# Patient Record
Sex: MALE | Race: WHITE | NOT HISPANIC OR LATINO | Employment: FULL TIME | URBAN - METROPOLITAN AREA
[De-identification: names, ages, dates, MRNs, and addresses within clinical notes are randomized per-mention and may not be internally consistent; named-entity substitution may affect disease eponyms.]

---

## 2017-07-25 ENCOUNTER — OFFICE VISIT (OUTPATIENT)
Dept: FAMILY MEDICINE | Facility: CLINIC | Age: 32
End: 2017-07-25
Payer: COMMERCIAL

## 2017-07-25 VITALS
WEIGHT: 179 LBS | BODY MASS INDEX: 23.72 KG/M2 | DIASTOLIC BLOOD PRESSURE: 84 MMHG | TEMPERATURE: 97.8 F | HEART RATE: 65 BPM | HEIGHT: 73 IN | SYSTOLIC BLOOD PRESSURE: 125 MMHG

## 2017-07-25 DIAGNOSIS — Z23 NEED FOR VACCINATION: Primary | ICD-10-CM

## 2017-07-25 DIAGNOSIS — Z11.3 SCREEN FOR STD (SEXUALLY TRANSMITTED DISEASE): ICD-10-CM

## 2017-07-25 DIAGNOSIS — M79.10 MUSCLE PAIN: ICD-10-CM

## 2017-07-25 LAB — ERYTHROCYTE [SEDIMENTATION RATE] IN BLOOD BY WESTERGREN METHOD: 1 MM/H (ref 0–15)

## 2017-07-25 PROCEDURE — 86618 LYME DISEASE ANTIBODY: CPT | Performed by: FAMILY MEDICINE

## 2017-07-25 PROCEDURE — 86431 RHEUMATOID FACTOR QUANT: CPT | Performed by: FAMILY MEDICINE

## 2017-07-25 PROCEDURE — 87491 CHLMYD TRACH DNA AMP PROBE: CPT | Performed by: FAMILY MEDICINE

## 2017-07-25 PROCEDURE — 85652 RBC SED RATE AUTOMATED: CPT | Performed by: FAMILY MEDICINE

## 2017-07-25 PROCEDURE — 90714 TD VACC NO PRESV 7 YRS+ IM: CPT | Performed by: FAMILY MEDICINE

## 2017-07-25 PROCEDURE — 90471 IMMUNIZATION ADMIN: CPT | Performed by: FAMILY MEDICINE

## 2017-07-25 PROCEDURE — 99213 OFFICE O/P EST LOW 20 MIN: CPT | Mod: 25 | Performed by: FAMILY MEDICINE

## 2017-07-25 PROCEDURE — 87591 N.GONORRHOEAE DNA AMP PROB: CPT | Performed by: FAMILY MEDICINE

## 2017-07-25 PROCEDURE — 86140 C-REACTIVE PROTEIN: CPT | Performed by: FAMILY MEDICINE

## 2017-07-25 PROCEDURE — 36415 COLL VENOUS BLD VENIPUNCTURE: CPT | Performed by: FAMILY MEDICINE

## 2017-07-25 ASSESSMENT — PAIN SCALES - GENERAL: PAINLEVEL: MILD PAIN (3)

## 2017-07-25 NOTE — PATIENT INSTRUCTIONS
Thank you for choosing [facility].  You may be receiving a survey in the mail from [facility] regarding your visit today.  Please take a few minutes to complete and return the survey to let us know how we are doing.      Our Clinic hours are:  Mondays    7:20 am - 7 pm  Tues -  Fri  7:20 am - 5 pm      The clinic lab opens at 7:30 am Mon - Fri and appointments are required.    [facility]  Ph. [phone]  Monday-Thursday 8 am - 7pm  Tues/Wed/Fri 8 am - 5:30 pm

## 2017-07-25 NOTE — NURSING NOTE
"Chief Complaint   Patient presents with     Pain     pt has been having over all body pain on and off for 3-4 weeks       Initial /84 (BP Location: Right arm, Patient Position: Chair, Cuff Size: Adult Large)  Pulse 65  Temp 97.8  F (36.6  C) (Tympanic)  Ht 6' 0.5\" (1.842 m)  Wt 179 lb (81.2 kg)  BMI 23.94 kg/m2 Estimated body mass index is 23.94 kg/(m^2) as calculated from the following:    Height as of this encounter: 6' 0.5\" (1.842 m).    Weight as of this encounter: 179 lb (81.2 kg).  Medication Reconciliation: complete   Alyce Palafox CMA     Screening Questionnaire for Adult Immunization    Are you sick today?   No   Do you have allergies to medications, food, a vaccine component or latex?   No   Have you ever had a serious reaction after receiving a vaccination?   No   Do you have a long-term health problem with heart disease, lung disease, asthma, kidney disease, metabolic disease (e.g. diabetes), anemia, or other blood disorder?   No   Do you have cancer, leukemia, HIV/AIDS, or any other immune system problem?   No   In the past 3 months, have you taken medications that affect  your immune system, such as prednisone, other steroids, or anticancer drugs; drugs for the treatment of rheumatoid arthritis, Crohn s disease, or psoriasis; or have you had radiation treatments?   No   Have you had a seizure, or a brain or other nervous system problem?   No   During the past year, have you received a transfusion of blood or blood     products, or been given immune (gamma) globulin or antiviral drug?   No   For women: Are you pregnant or is there a chance you could become        pregnant during the next month?   No   Have you received any vaccinations in the past 4 weeks?   No     Immunization questionnaire answers were all negative.      MNVFC doesn't apply on this patient    Per orders of Dr. Royer Owens, injection of TD given by Alyce Palafox. Patient instructed to remain in clinic for 15 minutes " afterwards, and to report any adverse reaction to me immediately.       Screening performed by Alyce Palafox on 7/25/2017 at 4:15 PM.

## 2017-07-25 NOTE — MR AVS SNAPSHOT
After Visit Summary   7/25/2017    Abhay Ramirez    MRN: [id number]           Patient Information     Date Of Birth          1985        Visit Information        Provider Department      7/25/2017 4:00 PM Royer Owens MD [facility]        Today's Diagnoses     Need for vaccination    -  1    Muscle pain        Screen for STD (sexually transmitted disease)        Human immunodeficiency virus (HIV) disease (H)          Care Instructions    Thank you for choosing [facility].  You may be receiving a survey in the mail from Denise Santos regarding your visit today.  Please take a few minutes to complete and return the survey to let us know how we are doing.      Our Clinic hours are:  Mondays    7:20 am - 7 pm  Tues -  Fri  7:20 am - 5 pm      The clinic lab opens at 7:30 am Mon - Fri and appointments are required.    [facility]  Ph. [phone]  Monday-Thursday 8 am - 7pm  Tues/Wed/Fri 8 am - 5:30 pm                 Follow-ups after your visit        Who to contact     If you have questions or need follow up information about today's clinic visit or your schedule please contact [facility] directly at [phone].  Normal or non-critical lab and imaging results will be communicated to you by MyChart, letter or phone within 4 business days after the clinic has received the results. If you do not hear from us within 7 days, please contact the clinic through MyChart or phone. If you have a critical or abnormal lab result, we will notify you by phone as soon as possible.  Submit refill requests through [employer] or call your pharmacy and they will forward the refill request to us. Please allow 3 business days for your refill to be completed.          Additional Information About Your Visit        Sebacia[employer]-Fill Information     [employer] gives you secure access to your electronic health record. If you see a primary care provider, you can  "also send messages to your care team and make appointments. If you have questions, please call your primary care clinic.  If you do not have a primary care provider, please call [phone] and they will assist you.        Care EveryWhere ID     This is your Care EveryWhere ID. This could be used by other organizations to access your [place] medical records  [id number]        Your Vitals Were     Pulse Temperature Height BMI (Body Mass Index)          65 97.8  F (36.6  C) (Tympanic) 6' 0.5\" (1.842 m) 23.94 kg/m2         Blood Pressure from Last 3 Encounters:   07/25/17 125/84   02/26/16 138/90   04/17/15 120/74    Weight from Last 3 Encounters:   07/25/17 179 lb (81.2 kg)   02/26/16 175 lb (79.4 kg)   04/17/15 185 lb (83.9 kg)              We Performed the Following     1st  Administration  [[id number]]     CHLAMYDIA TRACHOMATIS PCR     CRP inflammation     Erythrocyte sedimentation rate auto     Lyme Disease Lillian with reflex to WB Serum     NEISSERIA GONORRHOEA PCR     Rheumatoid factor     TD PRSERV FREE >=7 YRS ADS IM [[id number]]        Primary Care Provider Office Phone # Fax #    Everette Shepherd [phone]       [facility] [address] [facility] [address]        Equal Access to Services     DARBY ANDERSEN AH: Hadii aad ku hadasho Soomaali, waaxda luqadaha, qaybta kaalmada adeegyada, waxay solomon haywing waite. So [facility] [place] [phone].    ATENCIÓN: Si habla español, tiene a polk disposición servicios gratuitos de asistencia lingüística. Llame al [phone].    We comply with applicable federal civil rights laws and [place] laws. We do not discriminate on the basis of race, color, national origin, age, disability sex, sexual orientation or gender identity.            Thank you!     Thank you for choosing [facility]  for your care. Our goal is always to provide you with excellent care. Hearing back from our patients is one way we can continue to improve " our services. Please take a few minutes to complete the written survey that you may receive in the mail after your visit with us. Thank you!             Your Updated Medication List - Protect others around you: Learn how to safely use, store and throw away your medicines at www.disposemymeds.org.          This list is accurate as of: 7/25/17  4:39 PM.  Always use your most recent med list.                   Brand Name Dispense Instructions for use Diagnosis    ADVIL PO      Take 400 mg by mouth every 4 hours as needed        METAMUCIL SMOOTH TEXTURE 58.6 % packet   Generic drug:  psyllium     1         triamcinolone 0.1 % cream    KENALOG    30 g    Apply sparingly to affected area three times daily as needed    Insect bites

## 2017-07-25 NOTE — PROGRESS NOTES
SUBJECTIVE:                                                    Abhay Ramirez is a 32 year old male who presents to clinic today for the following health issues:    For the last month he has had some aching that only lasts for about 10 seconds in an arm, a leg, and his back. Now he is having aching in other areas such as his fingers his knees and his hips. Will get rheumatology labs.    He is concerned about sexually transmitted diseases and will get screening labs.      Pt has been having over all body pain on and off for 3-4 weeks        Problem list and histories reviewed & adjusted, as indicated.  Additional history: as documented        Reviewed and updated as needed this visit by clinical staff  Tobacco  Allergies  Med Hx  Surg Hx  Fam Hx  Soc Hx      Reviewed and updated as needed this visit by Provider        Medical, surgical, family, social histories, allergies and meds reviewed and updated.    ROS:  General: No change in weight, sleep or appetite.  Normal energy.  No fever or chills  Resp: No coughing, wheezing or shortness of breath  CV: No chest pains or palpitations  GI: No nausea, vomiting,  heartburn, abdominal pain, diarrhea, constipation or change in bowel habits    Exam:  GENERAL APPEARANCE ADULT: Alert, no acute distress  MS: back exam: normal posture, moves about the exam room comfortably  wrist exam normal wrist appearance and range of motion  hand exam Normal hand appearance and range of motion, non-tender  knee exam normal knee exam, no swelling, tenderness, effusion or instability; ligaments intact, full ROM.    ASSESSMENT:  (Z23) Need for vaccination  (primary encounter diagnosis)  Comment:   Plan: TD PRSERV FREE >=7 YRS ADS IM [[id number]], 1st          Administration  [[id number]]            (M79.1) Muscle pain  Comment:   Plan: Rheumatoid factor, CRP inflammation,         Erythrocyte sedimentation rate auto, Lyme         Disease Lillian with reflex to WB Serum            (Z11.3) Screen for  STD (sexually transmitted disease)  Comment:   Plan: NEISSERIA GONORRHOEA PCR, CHLAMYDIA TRACHOMATIS        PCR            (B20) Human immunodeficiency virus (HIV) disease (H)  Comment:   Plan:       PLAN:  Orders Placed This Encounter     TD PRSERV FREE >=7 YRS ADS IM [[id number]]     1st  Administration  [[id number]]     Rheumatoid factor     CRP inflammation     Erythrocyte sedimentation rate auto     Lyme Disease Lillian with reflex to WB Serum   Recheck in clinic as needed.      Patient Instructions   Thank you for choosing [facility].  You may be receiving a survey in the mail [facility] your visit today.  Please take a few minutes to complete and return the survey to let us know how we are doing.      Our Clinic hours are:  Mondays    7:20 am - 7 pm  Tues -  Fri  7:20 am - 5 pm      The clinic lab opens at 7:30 am Mon - Fri and appointments are required.    [facility]  Ph. [phone]  Monday-Thursday 8 am - 7pm  Tues/Wed/Fri 8 am - 5:30 pm               Royer Owens

## 2017-07-26 LAB
B BURGDOR IGG+IGM SER QL: 0.32 (ref 0–0.89)
C TRACH DNA SPEC QL NAA+PROBE: NORMAL
CRP SERPL-MCNC: <2.9 MG/L (ref 0–8)
N GONORRHOEA DNA SPEC QL NAA+PROBE: NORMAL
SPECIMEN SOURCE: NORMAL
SPECIMEN SOURCE: NORMAL

## 2017-07-27 ENCOUNTER — TELEPHONE (OUTPATIENT)
Dept: NURSING | Facility: CLINIC | Age: 32
End: 2017-07-27

## 2017-07-27 ENCOUNTER — NURSE TRIAGE (OUTPATIENT)
Dept: NURSING | Facility: CLINIC | Age: 32
End: 2017-07-27

## 2017-07-27 LAB — RHEUMATOID FACT SER NEPH-ACNC: <20 IU/ML (ref 0–20)

## 2017-07-27 NOTE — TELEPHONE ENCOUNTER
I reviewed the message in the results, with Abhay. He would like copies of those results mailed to the address on file.  Thank you,  Kasey Doyle RN-[facility]

## 2017-07-28 ENCOUNTER — NURSE TRIAGE (OUTPATIENT)
Dept: NURSING | Facility: CLINIC | Age: 32
End: 2017-07-28

## 2017-07-28 ENCOUNTER — TELEPHONE (OUTPATIENT)
Dept: NURSING | Facility: CLINIC | Age: 32
End: 2017-07-28

## 2017-07-28 NOTE — TELEPHONE ENCOUNTER
Wonders if he should be tested for H Pylori since that is what his girlfriend has and he kissed her.  Epic encounter sent to clinic for them to call him.  Kasey Doyle RN-[facility]

## 2017-07-28 NOTE — TELEPHONE ENCOUNTER
States his girlfriend has H Pylori. He kissed her. Does he need to get tested? Please call him.   Kasey Doyle RN-[facility]

## 2017-07-28 NOTE — TELEPHONE ENCOUNTER
"Discussed with the patient the following information from up to date:  What is H. pylori infection? -- H. pylori infection occurs when a type of bacteria called \"H. pylori\" infects a person's stomach.  Many people have H. pylori infection. Most of the time, H. pylori infection does not lead to any problems or cause any symptoms. But in some people, H. pylori infection leads to problems that can cause symptoms. These problems can include:  ?Open sores, which are called \"ulcers,\" on the lining of a person's stomach or duodenum - The duodenum is the first part of the small intestine (figure 1).  ?Stomach cancer  These conditions can sometimes cause pain in the upper belly, bloating, nausea, or vomiting.  Doctors do not know why H. pylori infection leads to problems in some people and not others.  What are the symptoms of H. pylori infection? -- Most people with H. pylori infection have no symptoms. But people who have ulcers can have symptoms that are caused by the ulcers. Common symptoms of ulcers can include:  ?Pain in the upper belly  ?Feeling bloated  ?Feeling full after eating a small amount of food  ?Not feeling hungry  ?Nausea or vomiting  ?Dark or black-colored bowel movements  ?Feeling more tired than usual  Not all ulcers are caused by H. pylori infection. For example, people can get ulcers from taking certain pain-relieving medicines. But if you have the symptoms listed above, let your doctor or nurse know.  Is there a test for H. pylori infection? -- Yes. Doctors can do different tests to diagnose H. pylori infection. These can include:  ?Blood tests  ?Breath tests - These tests measure substances in a person's breath after he or she has been given a special liquid to drink.  ?Lab tests that check a sample of a bowel movement for H. pylori infection  ?Biopsy - For this test, a doctor takes a small piece of tissue from the lining of the stomach. Then he or she looks at the tissue under a microscope. A doctor " can do a biopsy during a procedure called an endoscopy. An endoscopy is a procedure that lets a doctor look at the inside lining of the esophagus, stomach, and duodenum.  Should I be tested for H. pylori infection? -- You should be tested for H. pylori infection if you have symptoms and:  ?Have an ulcer in the stomach or duodenum  ?Have had ulcers in the past  Sometimes, doctors test people who have no symptoms for H. pylori infection if they have a family history of stomach cancer. Doctors also sometimes test people with symptoms who have never had an ulcer.    Patient agrees with the plan and understands.    Ashly PISANO RN

## 2017-07-31 PROBLEM — B20 HUMAN IMMUNODEFICIENCY VIRUS (HIV) DISEASE (H): Status: ACTIVE | Noted: 2017-07-31

## 2023-02-08 ENCOUNTER — NURSE TRIAGE (OUTPATIENT)
Dept: NURSING | Facility: CLINIC | Age: 38
End: 2023-02-08
Payer: COMMERCIAL

## 2023-02-08 NOTE — TELEPHONE ENCOUNTER
Abhay calling with reoccurring wide spread rash, itchy, red hives that comes and goes. This has had for over a month. He has been taking Benadryl and the rash is mostly gone now but it keeps reoccurring and he wants to be seen and get establish with PCP. Care advice is to be seen within 2 weeks. Pt agreed.    Angie Yousif RN on 2/8/2023 at 6:07 PM      Reason for Disposition    Itching is a chronic symptom (recurrent or ongoing AND present > 4 weeks)    Additional Information    Negative: [1] Life-threatening reaction (anaphylaxis) in the past to similar substance (e.g., food, insect bite/sting, chemical, etc.) AND [2] < 2 hours since exposure    Negative: Difficulty breathing or wheezing    Negative: [1] Difficulty swallowing or slurred speech AND [2] sudden onset    Negative: Sounds like a life-threatening emergency to the triager    Negative: Patient sounds very sick or weak to the triager    Negative: [1] MODERATE-SEVERE widespread itching (i.e., interferes with sleep, normal activities or school) AND [2] not improved after 24 hours of itching Care Advice    Negative: [1] MODERATE-SEVERE widespread itching (i.e., interferes with sleep, normal activities or school) AND [2] pregnant    Negative: Taking prescription medication that could cause itching (e.g., codeine/morphine/other opiates, aspirin)    Negative: [1] Hand or foot itching AND [2] pregnant    Negative: [1] Widespread itching AND [2] cause unknown AND [3] present > 48 hours (Exception: caller knows the cause and can eliminate it)    Negative: [1] MILD widespread itching AND [2] pregnant    Protocols used: ITCHING - WIDESPREAD-A-

## 2023-05-04 ENCOUNTER — HOSPITAL ENCOUNTER (EMERGENCY)
Facility: CLINIC | Age: 38
Discharge: HOME | End: 2023-05-04
Payer: COMMERCIAL

## 2023-05-04 ENCOUNTER — NURSE TRIAGE (OUTPATIENT)
Dept: NURSING | Facility: CLINIC | Age: 38
End: 2023-05-04
Payer: COMMERCIAL

## 2023-05-04 VITALS
TEMPERATURE: 97.9 F | DIASTOLIC BLOOD PRESSURE: 84 MMHG | HEART RATE: 66 BPM | SYSTOLIC BLOOD PRESSURE: 121 MMHG | OXYGEN SATURATION: 98 % | RESPIRATION RATE: 18 BRPM

## 2023-05-04 DIAGNOSIS — J06.9 VIRAL URI WITH COUGH: ICD-10-CM

## 2023-05-04 LAB — GROUP A STREP BY PCR: NOT DETECTED

## 2023-05-04 PROCEDURE — G0463 HOSPITAL OUTPT CLINIC VISIT: HCPCS

## 2023-05-04 PROCEDURE — 87651 STREP A DNA AMP PROBE: CPT

## 2023-05-04 PROCEDURE — 99203 OFFICE O/P NEW LOW 30 MIN: CPT

## 2023-05-04 RX ORDER — BENZONATATE 200 MG/1
200 CAPSULE ORAL 3 TIMES DAILY PRN
Qty: 30 CAPSULE | Refills: 0 | Status: SHIPPED | OUTPATIENT
Start: 2023-05-04

## 2023-05-04 ASSESSMENT — ACTIVITIES OF DAILY LIVING (ADL): ADLS_ACUITY_SCORE: 35

## 2023-05-04 NOTE — TELEPHONE ENCOUNTER
Nurse Triage SBAR    Is this a 2nd Level Triage? YES, LICENSED PRACTITIONER REVIEW IS REQUIRED    Situation: Patient has productive cough with blood tinged sputum    Background: Patient had a cold that developed into a productive cough. This morning he had blood tinged sputum along with green thick mucus.  Patient took a home covid test this morning and it was negative    Assessment: blood tinged sputum    Protocol Recommended Disposition:   See in Office Today    Recommendation: Patient verbalized understanding of care advice.      Jenna Hartman RN on 5/4/2023 at 8:51 AM      Does the patient meet one of the following criteria for ADS visit consideration? 16+ years old, no PCP (internal or external) but seen at [facility]     TIP  Providers, please consider if this condition is appropriate for management at one of our Acute and Diagnostic Services sites.     If patient is a good candidate, please use dotphrase <dot>triageresponse and select Refer to ADS to document.    Reason for Disposition    Coughing up keerthi-colored (reddish-brown) or blood-tinged sputum    Additional Information    Negative: Bluish (or gray) lips or face    Negative: SEVERE difficulty breathing (e.g., struggling for each breath, speaks in single words)    Negative: Rapid onset of cough and has hives    Negative: Coughing started suddenly after medicine, an allergic food or bee sting    Negative: Difficulty breathing after exposure to flames, smoke, or fumes    Negative: Sounds like a life-threatening emergency to the triager    Negative: MODERATE difficulty breathing (e.g., speaks in phrases, SOB even at rest, pulse 100-120) and still present when not coughing    Negative: Chest pain present when not coughing    Negative: Passed out (i.e., fainted, collapsed and was not responding)    Negative: Patient sounds very sick or weak to the triager    Negative: MILD difficulty breathing (e.g., minimal/no SOB at rest, SOB with walking, pulse  <100) and still present when not coughing    Negative: Coughed up > 1 tablespoon (15 ml) blood (Exception: Blood-tinged sputum.)    Negative: Fever > 103 F (39.4 C)    Negative: Fever > 101 F (38.3 C) and over 60 years of age    Negative: Fever > 100.0 F (37.8 C) and has diabetes mellitus or a weak immune system (e.g., HIV positive, cancer chemotherapy, organ transplant, splenectomy, chronic steroids)    Negative: Fever > 100.0 F (37.8 C) and bedridden (e.g., nursing home patient, stroke, chronic illness, recovering from surgery)    Negative: Increasing ankle swelling    Negative: Wheezing is present    Negative: SEVERE coughing spells (e.g., whooping sound after coughing, vomiting after coughing)    Protocols used: COUGH-A-OH

## 2023-05-04 NOTE — ED PROVIDER NOTES
Chief Complaint:   Chief Complaint   Patient presents with     Pharyngitis     X 8 days     Cough     Productive. Green with blood. X 2-3 days     Nasal Congestion         HPI:     Abhay Ramirez is a 38 year old male who presents to the  with a 7 day history of sore throat.  He has also had Rhinorrhea, Cough for 4 day(s), Diarrhea.  He has not had Fever, Rash, Nausea, Vomitting, Malaise, shortness of breath.  He has tried excedrin with relief of symptoms.  He has not had a rash. He has not been exposed to Strep.  Reports he feels the symptoms are generally improving.  States he noted his cough is productive once today and was concerned about this.  Denies any other new complaints at this time.  Negative COVID-19 testing x2 at home.    Medications:   Current Outpatient Medications   Medication Sig Dispense Refill     Ibuprofen (ADVIL PO) Take 400 mg by mouth every 4 hours as needed        METAMUCIL SMOOTH TEXTURE 58.6 % OR PACK  1 0     triamcinolone (KENALOG) 0.1 % cream Apply sparingly to affected area three times daily as needed 30 g 0       Allergies:   No Known Allergies    Medications updated and reviewed.  Past, family and surgical history is updated and reviewed in the record.     Review of Systems:  General: see HPI  HENT: see HPI  Skin: see HPI    Physical Exam:   /84   Pulse 66   Temp 97.9  F (36.6  C) (Tympanic)   Resp 18   SpO2 98%    General: Patient is well nourished, well developed, well groomed and in no acute distress  Ears: negative  Nose: no drainage.  Mouth/Throat: erythematous and normal: no lesions, adenopthy or exudate.  Trismus is not present. Muffled voice is not present. Uvular shift is not present.   Neck: Neck supple. No adenopathy.   Chest/Pulmonary: normal, clear to auscultation and S1-S2, RRR, no murmur      Medical Decision Making:  Sore throat with no exam findings to suggest peritonsillar abscess.  Strep testing by PCR negative..       Assessment:  Viral  URI    Plan:   We will treat symptoms of pharyngitis and antibiotics are not indicated.    He was advised to gargle with warm salt water 4 times a day and try to drink as much fluid as possible. Use acetaminophen, ibuprofen for discomfort. Return to the ER with increased pain, inability to swallow fluids, fever, rash or any concerns.  Tessalon Perles as needed for cough up to 3 times daily.    Condition on disposition: Stable    Disclaimer:  This note consists of symbols derived from keyboarding, dictation and/or voice recognition software.  As a result, there may be errors in the script that have gone undetected.  Please consider this when interpreting information found in this chart.           Abel Fontenot APRN CNP  05/04/23 152

## 2023-05-04 NOTE — DISCHARGE INSTRUCTIONS
Tessalon Perles up to 3 times daily as needed for cough.  Please return for new or worsening symptoms such as shortness of breath.

## 2025-01-21 ENCOUNTER — NURSE TRIAGE (OUTPATIENT)
Dept: FAMILY MEDICINE | Facility: CLINIC | Age: 40
End: 2025-01-21

## 2025-01-21 NOTE — TELEPHONE ENCOUNTER
Unable to reach patient.     Message left that it is okay to address pt concern at appt tomorrow with Dr. Galloway per both Dr. Galloway and covering Provider, Dr. Whitlock.     Call back if questions. Evi Farris RN

## 2025-01-21 NOTE — TELEPHONE ENCOUNTER
Rerouting to [facility] as Dr. Galloway is not in clinic today.      MICHAEL Villarreal

## 2025-01-21 NOTE — TELEPHONE ENCOUNTER
"Dr. Galloway:    Patient calls the clinic to try to schedule a virtual appointment for a mild headache he has today after bumping his left side of head on an ice fishing rack last Saturday, he says he had moderate 6/10 left sided sharp headache pain all day Sunday (has been using Excedrin for this), went away for most of Monday but then returned last Monday evening, today it is dull and rates 1/10. Patient was told he could be seen today but he would need to be seen in Urgent Care/ER as he has not been seen since 2017 in [facility] (saw Dr. RADHA Owens who has since retired). Patient would need to re-establish care to have appointment. He prefers to be seen in [facility], writer scheduled patient for establish care with you for tomorrow, but he also wants to address this mild headache. Please review for second level triage. RN advised home care with tylenol or ibuprofen, cold pack to head until can be see, if headache worsens or symptoms worsen seek the ER.    Any change in recommendations?      Reason for Disposition   MILD - MODERATE headache present > 3 days (72 hours)    Answer Assessment - Initial Assessment Questions  1. LOCATION: \"Where does it hurt?\"       Patient calls the clinic, he was trying to schedule a virtual appointment for headache pain, he has not been seen since 2017. Patient reports he bumped the left side of his head on a rack when ice fishing last Saturday, he has had a moderate 6/10 sharp headache on the left top and forehead on Sunday, says it went away most of Monday but then last evening it re-occurred, today the pain is very mild and rates 1/10.  2. ONSET: \"When did the headache start?\" (e.g., minutes, hours, days)       Started on Sunday, but patient bumped it on Saturday.  3. PATTERN: \"Does the pain come and go, or has it been constant since it started?\"      Constant pain on Sunday, went away for most of Monday but then started back up yesterday evening, then today it is dull.  4. SEVERITY: " "\"How bad is the pain?\" and \"What does it keep you from doing?\"  (e.g., Scale 1-10; mild, moderate, or severe)      Rates it is mild now at 1/10.  5. RECURRENT SYMPTOM: \"Have you ever had headaches before?\" If Yes, ask: \"When was the last time?\" and \"What happened that time?\"       New symptom. Has been using Exedrin to treat his headache pain and says this helps.  6. CAUSE: \"What do you think is causing the headache?\"      Patient reports he bumped it on a rack in his ice fishing shack.  7. MIGRAINE: \"Have you been diagnosed with migraine headaches?\" If Yes, ask: \"Is this headache similar?\"       Does not report any migraines.  8. HEAD INJURY: \"Has there been any recent injury to the head?\"       Denies any goose eggs or bruises on the left side of head, no scratches.  9. OTHER SYMPTOMS: \"Do you have any other symptoms?\" (e.g., fever, stiff neck, eye pain, sore throat, cold symptoms)      Denies eye problems, no tingling or numbness, no stiff neck, no fever.  10. PREGNANCY: \"Is there any chance you are pregnant?\" \"When was your last menstrual period?\"        na    Protocols used: Headache-A-OH        MICHAEL Villarreal    "